# Patient Record
Sex: FEMALE | URBAN - METROPOLITAN AREA
[De-identification: names, ages, dates, MRNs, and addresses within clinical notes are randomized per-mention and may not be internally consistent; named-entity substitution may affect disease eponyms.]

---

## 2023-01-21 ENCOUNTER — NURSE TRIAGE (OUTPATIENT)
Dept: ADMINISTRATIVE | Facility: CLINIC | Age: 23
End: 2023-01-21

## 2023-01-21 NOTE — TELEPHONE ENCOUNTER
Caller c/o abnormal vaginal bleeding. Pt states she has had three menstrual cycle in 1.5 months. Pt denies heavy bleeding or ABd pain at this time.  Pt advised per protocol and verbalized understanding.     Reason for Disposition   [1] Menstrual cycle < 21 days OR > 35 days AND [2] occurs more than two cycles (2 months) this past year    Additional Information   Negative: Shock suspected (e.g., cold/pale/clammy skin, too weak to stand, low BP, rapid pulse)   Negative: Difficult to awaken or acting confused (e.g., disoriented, slurred speech)   Negative: Passed out (i.e., lost consciousness, collapsed and was not responding)   Negative: Sounds like a life-threatening emergency to the triager   Negative: SEVERE abdominal pain   Negative: SEVERE dizziness (e.g., unable to stand, requires support to walk, feels like passing out now)   Negative: SEVERE vaginal bleeding (e.g., soaking 2 pads or tampons per hour and present 2 or more hours; 1 menstrual cup every 2 hours)   Negative: Patient sounds very sick or weak to the triager   Negative: MODERATE vaginal bleeding (e.g., soaking 1 pad or tampon per hour and present > 6 hours; 1 menstrual cup every 6 hours)   Negative: [1] Constant abdominal pain AND [2] present > 2 hours   Negative: Pale skin (pallor) of new-onset or worsening   Negative: Passed tissue (e.g., gray-white)   Negative: Taking Coumadin (warfarin) or other strong blood thinner, or known bleeding disorder (e.g., thrombocytopenia)   Negative: [1] Skin bruises or nosebleed AND [2] not caused by an injury   Negative: [1] Periods with > 6 soaked pads or tampons per day AND [2] last > 7 days   Negative: [1] Bleeding or spotting after procedure (e.g., biopsy) or pelvic examination (e.g., pap smear) AND [2] lasts > 7 days   Negative: Periods with > 6 soaked pads or tampons per day   Negative: Periods last > 7 days   Negative: [1] Uses menstrual cups AND [2] more than 80 ml blood per menstrual period.   Negative: [1]  Missed period AND [2] has occurred 2 or more times in the last year    Protocols used: Vaginal Bleeding - [id number]